# Patient Record
Sex: MALE | Race: WHITE | NOT HISPANIC OR LATINO | Employment: OTHER | URBAN - METROPOLITAN AREA
[De-identification: names, ages, dates, MRNs, and addresses within clinical notes are randomized per-mention and may not be internally consistent; named-entity substitution may affect disease eponyms.]

---

## 2023-01-25 NOTE — PROGRESS NOTES
"   New Left Shoulder      Patient: Adair Barreto        YOB: 1950    Medical Record Number: [id number]        Chief Complaints: Left shoulder pain      History of Present Illness: This is a 72-year-old male who is right-hand dominant presents left shoulder pain he states is been ongoing for many months he states is intermittent 2 days ago it was terrible today is much better.  He is seeing physical therapy he is an avid golfer and a retired filmmaker past medical history is unremarkable      Allergies: No Known Allergies    Medications:   Home Medications:  Current Outpatient Medications on File Prior to Visit   Medication Sig   • omeprazole OTC (PriLOSEC OTC) 20 MG EC tablet Take  by mouth.     No current facility-administered medications on file prior to visit.     Current Medications:  Scheduled Meds:  Continuous Infusions:No current facility-administered medications for this visit.    PRN Meds:.    History reviewed. No pertinent past medical history.     Past Surgical History:   Procedure Laterality Date   • APPENDECTOMY     • EYE SURGERY      lazy eye correction   • TONSILLECTOMY          Social History     Occupational History   • Not on file   Tobacco Use   • Smoking status: Never   • Smokeless tobacco: Never   Vaping Use   • Vaping Use: Never used   Substance and Sexual Activity   • Alcohol use: Yes   • Drug use: Defer   • Sexual activity: Defer      Social History     Social History Narrative   • Not on file        Family History   Problem Relation Age of Onset   • Stroke Mother              Review of Systems:     Review of Systems      Physical Exam: 72 y.o. male  General Appearance:    Alert, cooperative, in no acute distress                   Vitals:    01/26/23 0904   Temp: 97.3 °F (36.3 °C)   Weight: 72.6 kg (160 lb 1.6 oz)   Height: 172.7 cm (68\")   PainSc:   7      Patient is alert and read ×3 no acute distress appears her above-listed at height weight and age.  Affect is normal " respiratory rate is normal unlabored. Heart rate regular rate rhythm, sclera, dentition and hearing are normal for the purpose of this exam.    Ortho Exam  Physical exam of the left shoulder reveals no overlying skin changes no lymphedema no lymphadenopathy.  Patient has active flexion 180 with mild symptoms and some substitution abduction is similar external rotation is to 50 and internal rotation to the upper lumbar spine with mild symptoms.  Patient has good rotator cuff strength 4 over 5 with isometric strength testing with pain.  Patient has a positive impingement and a positive Carrizales sign. .  Good distal pulses are presentPatient has pain with overhead activity and a positive Neer sign and a positive empty can sign  They have a positive drop arm any definitive painful arc  He does have decrease in cervical range of motion in all directions which does reproduce some of his symptoms  Large Joint Arthrocentesis: L subacromial bursa  Date/Time: 1/26/2023 10:34 AM  Consent given by: patient  Site marked: site marked  Timeout: Immediately prior to procedure a time out was called to verify the correct patient, procedure, equipment, support staff and site/side marked as required   Supporting Documentation  Indications: pain   Procedure Details  Location: shoulder - L subacromial bursa  Preparation: Patient was prepped and draped in the usual sterile fashion  Needle gauge: 21G.  Approach: posterior  Medications administered: 80 mg methylPREDNISolone acetate 80 MG/ML; 2 mL lidocaine PF 1% 1 %  Patient tolerance: patient tolerated the procedure well with no immediate complications                Radiology:   AP, Scapular Y and Axillary Lateral of the left shoulder were ordered/reviewed to evauate shoulder pain.  I have no comparative films he has some acromioclavicular arthritisBut this is mild otherwise no acute pathology also took AP and lateral cervical spine with no comparative films at C5-6 he is near complete  loss of disc space he also has some osteophytes anteriorly seen throughout the cervical spine loss of cervical lordosis  Imaging Results (Most Recent)     Procedure Component Value Units Date/Time    XR Spine Cervical 2 or 3 View [[id number]] Resulted: 01/26/23 0931     Updated: 01/26/23 0931    Impression:      Ordering physician's impression is located in the Encounter Note dated 01/26/23. X-ray performed in the DR room.      XR Shoulder 2+ View Left [[id number]] Resulted: 01/26/23 0849     Updated: 01/26/23 0849    Impression:      Ordering physician's impression is located in the Encounter Note dated 01/26/23. X-ray performed in the DR room.          Assessment/Plan: Left shoulder pain and back pain I do think he has an element of both I do think he probably has a rotator cuff tear and I think he has some cervical issues.  I think it is reasonable to do an injection I will have him continue with physical therapy if injection fails to give him relief I will have him get an MRI.  Cortisone Injection. See procedure note.  Cortisone Injection for DIAGNOSTIC and THERAPUTIC purposes.

## 2023-01-26 ENCOUNTER — OFFICE VISIT (OUTPATIENT)
Dept: ORTHOPEDIC SURGERY | Facility: CLINIC | Age: 73
End: 2023-01-26
Payer: MEDICARE

## 2023-01-26 VITALS — TEMPERATURE: 97.3 F | BODY MASS INDEX: 24.26 KG/M2 | WEIGHT: 160.1 LBS | HEIGHT: 68 IN

## 2023-01-26 DIAGNOSIS — M25.512 LEFT SHOULDER PAIN, UNSPECIFIED CHRONICITY: Primary | ICD-10-CM

## 2023-01-26 DIAGNOSIS — M75.102 NONTRAUMATIC TEAR OF LEFT ROTATOR CUFF, UNSPECIFIED TEAR EXTENT: ICD-10-CM

## 2023-01-26 DIAGNOSIS — M54.2 NECK PAIN: ICD-10-CM

## 2023-01-26 PROCEDURE — 72040 X-RAY EXAM NECK SPINE 2-3 VW: CPT | Performed by: ORTHOPAEDIC SURGERY

## 2023-01-26 PROCEDURE — 20610 DRAIN/INJ JOINT/BURSA W/O US: CPT | Performed by: ORTHOPAEDIC SURGERY

## 2023-01-26 PROCEDURE — 73030 X-RAY EXAM OF SHOULDER: CPT | Performed by: ORTHOPAEDIC SURGERY

## 2023-01-26 PROCEDURE — 99204 OFFICE O/P NEW MOD 45 MIN: CPT | Performed by: ORTHOPAEDIC SURGERY

## 2023-01-26 RX ORDER — LIDOCAINE HYDROCHLORIDE 10 MG/ML
2 INJECTION, SOLUTION EPIDURAL; INFILTRATION; INTRACAUDAL; PERINEURAL
Status: COMPLETED | OUTPATIENT
Start: 2023-01-26 | End: 2023-01-26

## 2023-01-26 RX ORDER — OMEPRAZOLE 20 MG/1
TABLET, DELAYED RELEASE ORAL
COMMUNITY

## 2023-01-26 RX ORDER — METHYLPREDNISOLONE ACETATE 80 MG/ML
80 INJECTION, SUSPENSION INTRA-ARTICULAR; INTRALESIONAL; INTRAMUSCULAR; SOFT TISSUE
Status: COMPLETED | OUTPATIENT
Start: 2023-01-26 | End: 2023-01-26

## 2023-01-26 RX ADMIN — METHYLPREDNISOLONE ACETATE 80 MG: 80 INJECTION, SUSPENSION INTRA-ARTICULAR; INTRALESIONAL; INTRAMUSCULAR; SOFT TISSUE at 10:34

## 2023-01-26 RX ADMIN — LIDOCAINE HYDROCHLORIDE 2 ML: 10 INJECTION, SOLUTION EPIDURAL; INFILTRATION; INTRACAUDAL; PERINEURAL at 10:34

## 2023-01-27 ENCOUNTER — PATIENT ROUNDING (BHMG ONLY) (OUTPATIENT)
Dept: ORTHOPEDIC SURGERY | Facility: CLINIC | Age: 73
End: 2023-01-27
Payer: MEDICARE

## 2023-01-27 NOTE — PROGRESS NOTES
A [employer] Message has been sent to the patient for PATIENT ROUNDING with [facility]

## 2023-02-22 NOTE — PROGRESS NOTES
Patient: Adair Barreto  YOB: 1950  Date of Service: 2/22/2023    Chief Complaints: Neck and left shoulder pain    Subjective:    History of Present Illness: Pt is seen in the office today with complaints of neck and left shoulder pain I injected his shoulder last visit he states he did get great relief but it was temporary he drove to [place] and had some increase in pain in the neck and shoulder he was squeezing a bunch of stuff and has had worsening of his symptoms.  I am still convinced he has 2 issues I think some of it is rotator cuff but I think a lot of this is neck he has done physical therapy        Allergies: No Known Allergies    Medications:   Home Medications:  Current Outpatient Medications on File Prior to Visit   Medication Sig   • omeprazole OTC (PriLOSEC OTC) 20 MG EC tablet Take  by mouth.     No current facility-administered medications on file prior to visit.     Current Medications:  Scheduled Meds:  Continuous Infusions:No current facility-administered medications for this visit.    PRN Meds:.    I have reviewed the patient's medical history in detail and updated the computerized patient record.  Review and summarization of old records include:    No past medical history on file.     Past Surgical History:   Procedure Laterality Date   • APPENDECTOMY     • EYE SURGERY      lazy eye correction   • TONSILLECTOMY          Social History     Occupational History   • Not on file   Tobacco Use   • Smoking status: Never   • Smokeless tobacco: Never   Vaping Use   • Vaping Use: Never used   Substance and Sexual Activity   • Alcohol use: Yes   • Drug use: Defer   • Sexual activity: Defer      Social History     Social History Narrative   • Not on file        Family History   Problem Relation Age of Onset   • Stroke Mother        ROS: 14 point review of systems was performed and was negative except for documented findings in HPI and today's encounter.     Allergies: No Known  Allergies  Constitutional:  Denies fever, shaking or chills   Eyes:  Denies change in visual acuity   HENT:  Denies nasal congestion or sore throat   Respiratory:  Denies cough or shortness of breath   Cardiovascular:  Denies chest pain or severe LE edema   GI:  Denies abdominal pain, nausea, vomiting, bloody stools or diarrhea   Musculoskeletal:  Numbness, tingling, or loss of motor function only as noted above in history of present illness.  : Denies painful urination or hematuria  Integument:  Denies rash, lesion or ulceration   Neurologic:  Denies headache or focal weakness  Endocrine:  Denies lymphadenopathy  Psych:  Denies confusion or change in mental status   Hem:  Denies active bleeding      Physical Exam: 72 y.o. male  Wt Readings from Last 3 Encounters:   01/26/23 72.6 kg (160 lb 1.6 oz)       There is no height or weight on file to calculate BMI.    There were no vitals filed for this visit.  Vital signs reviewed.   General Appearance:    Alert, cooperative, in no acute distress                    Ortho exam    Physical exam of the left shoulder reveals no overlying skin changes no lymphedema no lymphadenopathy.  Patient has active flexion 180 with mild symptoms abduction is similar external rotation is to 50 and internal rotation to the upper lumbar spine with mild symptoms.  Patient has good rotator cuff strength 4+ over 5 with isometric strength testing with pain.  Patient has a positive impingement and a positive Carrizales sign.  He has marked decrease in cervical range of motion in all planes but especially sidebending and rotation some of this does reproduce some pain in the left upper traps he appears neurologically intact cardiomedial muscle test.  Patient has a normal elbow exam.  Good distal pulses are presentPatient has pain with overhead activity and a positive Neer sign and a positive empty can sign  They have a positive drop arm any definitive painful arc               Assessment: Left  shoulder and neck pain I am convinced she has 2 big issues going on I think he is got neck arthritis with pressure on nerves and I am concerned he has a rotator cuff tear.  I think he is exhausted conservative measures plan is to proceed with an MRI of both    Plan:   Follow up as indicated.  Ice, elevate, and rest as needed.  Discussed conservative measures of pain control including ice, bracing.  Also talked about the importance of strengthening and maintaining ideal body weight    Stefania Bettencourt M.D.

## 2023-02-23 ENCOUNTER — OFFICE VISIT (OUTPATIENT)
Dept: ORTHOPEDIC SURGERY | Facility: CLINIC | Age: 73
End: 2023-02-23
Payer: MEDICARE

## 2023-02-23 VITALS — TEMPERATURE: 97.5 F | HEIGHT: 68 IN | BODY MASS INDEX: 24.13 KG/M2 | WEIGHT: 159.2 LBS

## 2023-02-23 DIAGNOSIS — M47.812 NECK ARTHRITIS: ICD-10-CM

## 2023-02-23 DIAGNOSIS — M75.102 TEAR OF LEFT ROTATOR CUFF, UNSPECIFIED TEAR EXTENT, UNSPECIFIED WHETHER TRAUMATIC: Primary | ICD-10-CM

## 2023-02-23 PROCEDURE — 99213 OFFICE O/P EST LOW 20 MIN: CPT | Performed by: ORTHOPAEDIC SURGERY

## 2023-04-11 ENCOUNTER — HOSPITAL ENCOUNTER (OUTPATIENT)
Dept: MRI IMAGING | Facility: HOSPITAL | Age: 73
Discharge: HOME | End: 2023-04-11
Payer: MEDICARE

## 2023-04-11 ENCOUNTER — TELEPHONE (OUTPATIENT)
Dept: ORTHOPEDIC SURGERY | Facility: CLINIC | Age: 73
End: 2023-04-11

## 2023-04-11 DIAGNOSIS — M75.102 TEAR OF LEFT ROTATOR CUFF, UNSPECIFIED TEAR EXTENT, UNSPECIFIED WHETHER TRAUMATIC: ICD-10-CM

## 2023-04-11 DIAGNOSIS — M47.812 NECK ARTHRITIS: ICD-10-CM

## 2023-04-11 PROCEDURE — 72141 MRI NECK SPINE W/O DYE: CPT

## 2023-04-11 PROCEDURE — 73221 MRI JOINT UPR EXTREM W/O DYE: CPT

## 2023-04-11 NOTE — TELEPHONE ENCOUNTER
Caller: PATIENT    Relationship to patient: SELF     Best call back number: [phone]    Patient is needing: PATIENT IS SCHEDULED TO HAVE BOTH MRI'S TONIGHT AND WANTED TO LET DR. GARZA KNOW WHEN HIS MRI'S WERE GETTING DONE. PATIENT WAS UNSURE IF HE NEEDED TO SCHEDULE AN MRI FOLLOW UP APPT OR NOT SO I SPOKE WITH LIYA AT THE OFFICE AND SHE STATED DR. GARZA WOULD GIVE THE PATIENT A CALL TO GO OVER HIS MRI RESULTS. THANK YOU!

## 2023-04-14 ENCOUNTER — OFFICE VISIT (OUTPATIENT)
Dept: ORTHOPEDIC SURGERY | Facility: CLINIC | Age: 73
End: 2023-04-14
Payer: MEDICARE

## 2023-04-14 VITALS — TEMPERATURE: 98.4 F | HEIGHT: 68 IN | WEIGHT: 158.8 LBS | BODY MASS INDEX: 24.07 KG/M2

## 2023-04-14 DIAGNOSIS — M54.12 CERVICAL RADICULOPATHY: Primary | ICD-10-CM

## 2023-04-14 DIAGNOSIS — G95.89 MYELOMALACIA OF CERVICAL CORD: ICD-10-CM

## 2023-04-14 NOTE — PROGRESS NOTES
Patient Name: Adair Barreto   YOB: 1950  Referring Primary Care Physician: Kirk Thomas MD      Chief Complaint:    Chief Complaint   Patient presents with   • Cervical Spine - Establish Care, Pain        HPI:  Adair Barreto is a 72 y.o. male who presents to [facility] for evaluation of neck and left shoulder pain. This is an established patient the practice who recently saw Dr. Bettencourt for left shoulder pain and based on residual symptoms after physical therapy she referred him for cervical MRI and he is here to follow-up.  He denies any injury in relation to the neck but has had several sports and work related injuries through the years.  Denies any whiplash type injury but did have a significant fall onto the shoulder in the past.  Denies any weakness, saddle anesthesia, imbalance or incoordination.  He does have some paresthesia of the left biceps.  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  He has completed physical therapy on his shoulder, tried acupuncture and cupping with moderate results.  Prior pertinent records were reviewed.    PFSH:  See attached    ROS: As per HPI, otherwise negative    Objective:      72 y.o. male  Body mass index is 24.15 kg/m²., 72 kg (158 lb 12.8 oz), @HT@  Vitals:    04/14/23 0857   Temp: 98.4 °F (36.9 °C)         Neurologic Exam     Gait, Coordination, and Reflexes     Gait  Gait: normal    Reflexes   Right brachioradialis: hyporeflexic  Left brachioradialis: hyporeflexic  Right biceps: hyporeflexic  Left biceps: hyporeflexic  Right triceps: hyporeflexic  Left triceps: hyporeflexic  Right Terry: absent  Left Terry: absent     Ortho Exam    Spine Musculoskeletal Exam    Gait    Gait is normal.    Inspection    Shoulder elevation: right    Coronal balance: no  imbalance    Sagittal balance: no imbalance    Palpation    Cervical Spine    Tenderness: none    Right      Muscle tone: normal    Left      Muscle tone: normal    Range of Motion    Cervical Spine    Cervical flexion: normal.     Cervical extension: normal.       Right      Lateral bending: reduced bend (26-50%). Lateral bending detail: crepitus.       Lateral rotation: reduced rotation (0-25%). Lateral rotation detail: crepitus.       Left      Lateral bending: reduced bend (26-50%). Lateral bending detail: crepitus.       Lateral rotation: reduced rotation (0-25%). Lateral rotation detail: crepitus.      Sensory    Cervical Spine    Cervical spine sensation is normal.    Reflexes    Right        Biceps: hyporeflexic      Brachioradialis: hyporeflexic      Triceps: hyporeflexic      Quadriceps: hyporeflexic      Terry: absent    Left        Biceps: hyporeflexic        Brachioradialis: hyporeflexic      Triceps: hyporeflexic      Quadriceps: hyporeflexic      Terry: absent    General      Constitutional: well-developed and well-nourished    Scleral icterus: no    Labored breathing: no    Psychiatric: normal mood and affect and no acute distress    Neurological: alert and oriented x3    Skin: intact        IMAGING:       No imaging in office today.  Cervical MRI 4/11/2023 at [facility] was reviewed and reveals multilevel degenerative change, disc protrusion and mild anterolisthesis C4-5 and combination with facet arthropathy contribute to left greater than right foraminal narrowing and moderate central stenosis, disc space narrowing and osteophyte formation combination with facet arthropathy contribute to mild to moderate foraminal narrowing at C6-7, most significant findings C5-6 which is partially fused shows disc osteophyte and ligamentous thickening and facet arthropathy contributing to moderate central and foraminal narrowing with T2 signal change on the upper C6 vertebral body.  Agree with  report.    Assessment:           Diagnoses and all orders for this visit:    1. Cervical radiculopathy (Primary)  -     MRI Cervical Spine With & Without Contrast; Future    2. Myelomalacia of cervical cord  -     MRI Cervical Spine With & Without Contrast; Future             Plan:  I reviewed cervical MRI with Dr. Jaramillo.  He recommends we get cervical MRI with contrast to further evaluate the cord signal change.  Patient asks about returning to physical therapy more focused on the cervical spine, but would like to hold off until after the cervical MRI and will likely have him follow-up with Dr. Jaramillo once that is completed.  He does not have any myelopathic findings on exam, does not describe imbalance, incoordination or Lhermitte sign.  His symptoms are primarily occasional left shoulder pain which did improve after physical therapy on the shoulder with paresthesia in the left biceps.  He does however have some weakness of the left bicep.    Return for Call with results.    EMR Dragon/Transcription Disclaimer:   Much of this encounter note is an electronic transcription/translation of spoken language to printed text. The electronic translation of spoken language may permit erroneous, or at times, nonsensical words or phrases to be inadvertently transcribed; Although I have reviewed the note for such errors, some may still exist.  Answers for HPI/ROS submitted by the patient on 4/14/2023  What is the primary reason for your visit?: Back Pain  Chronicity: chronic  Onset: more than 1 month ago  Frequency: daily  Progression since onset: waxing and waning  Pain location: thoracic spine  Pain quality: burning  Radiates to: does not radiate  Pain - numeric: 5/10  Pain is: worse during the night  Aggravated by: sitting  Stiffness is present: at night  abdominal pain: No  bladder incontinence: No  bowel incontinence: No  chest pain: No  dysuria: No  fever: No  headaches: No  leg pain: No  numbness: No  paresis:  No  paresthesias: Yes  pelvic pain: No  perianal numbness: No  tingling: No  weakness: Yes  weight loss: No  Risk factors: poor posture

## 2023-05-04 ENCOUNTER — HOSPITAL ENCOUNTER (OUTPATIENT)
Dept: MRI IMAGING | Facility: HOSPITAL | Age: 73
Discharge: HOME | End: 2023-05-04
Admitting: NURSE PRACTITIONER
Payer: MEDICARE

## 2023-05-04 DIAGNOSIS — G95.89 MYELOMALACIA OF CERVICAL CORD: ICD-10-CM

## 2023-05-04 DIAGNOSIS — M54.12 CERVICAL RADICULOPATHY: ICD-10-CM

## 2023-05-04 PROCEDURE — A9577 INJ MULTIHANCE: HCPCS | Performed by: NURSE PRACTITIONER

## 2023-05-04 PROCEDURE — 82565 ASSAY OF CREATININE: CPT

## 2023-05-04 PROCEDURE — 0 GADOBENATE DIMEGLUMINE 529 MG/ML SOLUTION: Performed by: NURSE PRACTITIONER

## 2023-05-04 PROCEDURE — 72156 MRI NECK SPINE W/O & W/DYE: CPT

## 2023-05-04 RX ADMIN — GADOBENATE DIMEGLUMINE 15 ML: 529 INJECTION, SOLUTION INTRAVENOUS at 10:42

## 2023-05-05 LAB — CREAT BLDA-MCNC: 1.1 MG/DL (ref 0.6–1.3)

## 2023-05-09 ENCOUNTER — TELEPHONE (OUTPATIENT)
Dept: ORTHOPEDIC SURGERY | Facility: CLINIC | Age: 73
End: 2023-05-09
Payer: MEDICARE

## 2023-05-09 NOTE — TELEPHONE ENCOUNTER
Left message with results.    MRI confirmed myelomalacia and no other areas of concern or enhancement to be alarmed about.  Advised patient he can continue with physical therapy and if he needs a referral to let us know.  If he does not improve with physical therapy focused on the cervical spine, the plan is to have him follow-up with Dr. Jaramillo.

## 2023-05-10 ENCOUNTER — TELEPHONE (OUTPATIENT)
Dept: ORTHOPEDIC SURGERY | Facility: CLINIC | Age: 73
End: 2023-05-10
Payer: MEDICARE

## 2023-05-10 DIAGNOSIS — M54.12 CERVICAL RADICULOPATHY: Primary | ICD-10-CM

## 2023-05-10 NOTE — TELEPHONE ENCOUNTER
Returning call about MRI has a few questions.  Also wants order for PT.    Also wants to know if he can get another injection in his shoulder.  He had one previously with Dr. Bettencourt.    His call back # [phone]   no

## 2023-05-10 NOTE — TELEPHONE ENCOUNTER
Tried returning call.  No answer.  Left message on voicemail.  If he wants referral to physical therapy and calls back he can let us know where he wants to go and that order can be placed for me to sign if I am not available.

## 2023-05-15 ENCOUNTER — TELEPHONE (OUTPATIENT)
Dept: ORTHOPEDIC SURGERY | Facility: CLINIC | Age: 73
End: 2023-05-15
Payer: MEDICARE

## 2023-05-22 ENCOUNTER — OFFICE VISIT (OUTPATIENT)
Dept: ORTHOPEDIC SURGERY | Facility: CLINIC | Age: 73
End: 2023-05-22
Payer: MEDICARE

## 2023-05-22 VITALS — WEIGHT: 156 LBS | BODY MASS INDEX: 23.64 KG/M2 | HEIGHT: 68 IN | TEMPERATURE: 97.3 F

## 2023-05-22 DIAGNOSIS — M75.42 IMPINGEMENT SYNDROME OF LEFT SHOULDER: Primary | ICD-10-CM

## 2023-05-22 RX ADMIN — LIDOCAINE HYDROCHLORIDE 2 ML: 10 INJECTION, SOLUTION EPIDURAL; INFILTRATION; INTRACAUDAL; PERINEURAL at 14:51

## 2023-05-22 RX ADMIN — METHYLPREDNISOLONE ACETATE 80 MG: 80 INJECTION, SUSPENSION INTRA-ARTICULAR; INTRALESIONAL; INTRAMUSCULAR; SOFT TISSUE at 14:51

## 2023-05-22 NOTE — PROGRESS NOTES
Patient: Adair Barreto  YOB: 1950  Date of Service: 5/22/2023    Chief Complaints: Left shoulder pain    Subjective:    History of Present Illness: Pt is seen in the office today with complaints of left shoulder pain I really thought he had an element of impingement but also a lot of cervical symptoms.  He did see Bertha who did an MRI and he states with therapy his symptoms have almost completely resolve his shoulder is bothering him a little bit.  He did respond well to an injection I think is reasonable to do that again.  Again his cervical symptoms radicular symptoms have all seem to have resolved      Allergies: No Known Allergies    Medications:   Home Medications:  Current Outpatient Medications on File Prior to Visit   Medication Sig   • omeprazole OTC (PriLOSEC OTC) 20 MG EC tablet Take  by mouth.     No current facility-administered medications on file prior to visit.     Current Medications:  Scheduled Meds:  Continuous Infusions:No current facility-administered medications for this visit.    PRN Meds:.    I have reviewed the patient's medical history in detail and updated the computerized patient record.  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   • Periarthritis of shoulder    • Rotator cuff syndrome 8/2022        Past Surgical History:   Procedure Laterality Date   • APPENDECTOMY     • EYE SURGERY      lazy eye correction   • TONSILLECTOMY          Social History     Occupational History   • Not on file   Tobacco Use   • Smoking status: Never   • Smokeless tobacco: Never   Vaping Use   • Vaping Use: Never used   Substance and Sexual Activity   • Alcohol use: Yes     Alcohol/week: 3.0 standard drinks     Types: 3 Cans of beer per week   • Drug use: Never   • Sexual activity: Not Currently     Partners: Female      Social History     Social History  Narrative   • Not on file        Family History   Problem Relation Age of Onset   • Stroke Mother        ROS: 14 point review of systems was performed and was negative except for documented findings in HPI and today's encounter.     Allergies: No Known Allergies  Constitutional:  Denies fever, shaking or chills   Eyes:  Denies change in visual acuity   HENT:  Denies nasal congestion or sore throat   Respiratory:  Denies cough or shortness of breath   Cardiovascular:  Denies chest pain or severe LE edema   GI:  Denies abdominal pain, nausea, vomiting, bloody stools or diarrhea   Musculoskeletal:  Numbness, tingling, or loss of motor function only as noted above in history of present illness.  : Denies painful urination or hematuria  Integument:  Denies rash, lesion or ulceration   Neurologic:  Denies headache or focal weakness  Endocrine:  Denies lymphadenopathy  Psych:  Denies confusion or change in mental status   Hem:  Denies active bleeding      Physical Exam: 72 y.o. male  Wt Readings from Last 3 Encounters:   04/14/23 72 kg (158 lb 12.8 oz)   02/23/23 72.2 kg (159 lb 3.2 oz)   01/26/23 72.6 kg (160 lb 1.6 oz)       There is no height or weight on file to calculate BMI.    There were no vitals filed for this visit.  Vital signs reviewed.   General Appearance:    Alert, cooperative, in no acute distress                    Ortho exam  Physical exam of the left shoulder reveals no overlying skin changes no lymphedema no lymphadenopathy.  Patient has active flexion 180 with mild symptoms abduction is similar external rotation is to 50 and internal rotation to the upper lumbar spine with mild symptoms.  Patient has good rotator cuff strength 4+ over 5 with isometric strength testing with pain.  Patient has a positive impingement and a positive Carrizales sign.  He does have decreased cervical symptoms in all directions particularly sidebending and rotation but no radicular symptoms he is neurologically intact patient  has a normal elbow exam.  Good distal pulses are presentPatient has pain with overhead activity and a positive Neer sign and a positive empty can sign  They have a positive drop arm any definitive painful arc                 Assessment: Left shoulder pain I do think there is an element of impingement which I think responded well to an injection last visit he would like to do that again which I think is reasonable we did talk again about his cervical symptoms should that return or worsen I want him to call us and we might have him see Bertha again or neurosurgery    Plan:   Follow up as indicated.  Ice, elevate, and rest as needed.  Discussed conservative measures of pain control including ice, bracing.    Stefania Bettencourt M.D.    Large Joint Arthrocentesis: L subacromial bursa  Date/Time: 5/22/2023 2:51 PM  Consent given by: patient  Site marked: site marked  Timeout: Immediately prior to procedure a time out was called to verify the correct patient, procedure, equipment, support staff and site/side marked as required   Supporting Documentation  Indications: pain   Procedure Details  Location: shoulder - L subacromial bursa  Preparation: Patient was prepped and draped in the usual sterile fashion  Needle gauge: 21G.  Approach: posterior  Medications administered: 80 mg methylPREDNISolone acetate 80 MG/ML; 2 mL lidocaine PF 1% 1 %  Patient tolerance: patient tolerated the procedure well with no immediate complications

## 2023-05-23 RX ORDER — LIDOCAINE HYDROCHLORIDE 10 MG/ML
2 INJECTION, SOLUTION EPIDURAL; INFILTRATION; INTRACAUDAL; PERINEURAL
Status: COMPLETED | OUTPATIENT
Start: 2023-05-22 | End: 2023-05-22

## 2023-05-23 RX ORDER — METHYLPREDNISOLONE ACETATE 80 MG/ML
80 INJECTION, SUSPENSION INTRA-ARTICULAR; INTRALESIONAL; INTRAMUSCULAR; SOFT TISSUE
Status: COMPLETED | OUTPATIENT
Start: 2023-05-22 | End: 2023-05-22